# Patient Record
(demographics unavailable — no encounter records)

---

## 2025-03-13 NOTE — ADDENDUM
[FreeTextEntry1] : This note was written by Katherine Magana on 03/13/2025 acting as scribe for Kay Arthur, OTR/L, PA

## 2025-03-13 NOTE — HISTORY OF PRESENT ILLNESS
[de-identified] : Postop Left Knee [de-identified] : The patient comes in today status post left knee arthroscopic surgery on 02/21/2025.  She is doing excellent. [de-identified] : Left Knee: Her incisions are clean, dry and intact with no signs of infection.  No calf tenderness.  Good distal pulses.  Sutures were removed. [de-identified] : Status post left knee arthroscopic surgery [de-identified] : The patient is doing well status post left knee arthroscopic surgery.  She will start physical therapy because she is having a little difficulty with walking, so she wants to get a little stronger.  She will return to the office in four weeks.